# Patient Record
Sex: MALE | Race: WHITE | NOT HISPANIC OR LATINO | Employment: OTHER | URBAN - METROPOLITAN AREA
[De-identification: names, ages, dates, MRNs, and addresses within clinical notes are randomized per-mention and may not be internally consistent; named-entity substitution may affect disease eponyms.]

---

## 2017-11-28 ENCOUNTER — OFFICE VISIT (OUTPATIENT)
Dept: URGENT CARE | Facility: CLINIC | Age: 77
End: 2017-11-28
Payer: MEDICARE

## 2017-11-28 VITALS
WEIGHT: 273 LBS | BODY MASS INDEX: 40.43 KG/M2 | SYSTOLIC BLOOD PRESSURE: 141 MMHG | TEMPERATURE: 98 F | OXYGEN SATURATION: 97 % | HEART RATE: 79 BPM | HEIGHT: 69 IN | RESPIRATION RATE: 18 BRPM | DIASTOLIC BLOOD PRESSURE: 89 MMHG

## 2017-11-28 DIAGNOSIS — R42 DIZZINESS: ICD-10-CM

## 2017-11-28 DIAGNOSIS — M79.601 RIGHT ARM PAIN: ICD-10-CM

## 2017-11-28 DIAGNOSIS — R07.9 CHEST PAIN, UNSPECIFIED TYPE: Primary | ICD-10-CM

## 2017-11-28 PROCEDURE — 99203 OFFICE O/P NEW LOW 30 MIN: CPT | Mod: S$GLB,,, | Performed by: PHYSICIAN ASSISTANT

## 2017-11-28 NOTE — PROGRESS NOTES
"Subjective:       Patient ID: Boris Gupta is a 77 y.o. male.    Vitals:  height is 5' 9" (1.753 m) and weight is 123.8 kg (273 lb). His tympanic temperature is 97.8 °F (36.6 °C). His blood pressure is 141/89 (abnormal) and his pulse is 79. His respiration is 18 and oxygen saturation is 97%.     Chief Complaint: Dizziness and Elbow Pain    This is a 77 y.o. male with Past Medical History:  No date: Headache(784.0)  No date: HEARING LOSS  No date: HEART FAILURE      Comment: 15 yrs ago  No date: Hypertension  No date: Otitis media   who presents today with a chief complaint of dizziness and right elbow pain. Patient had to get help to get out of bed this morning. Better sitting up.Hx. Silent heart attack.Patient used Voltaren cream with relief.      Dizziness:   Chronicity:  New  Onset:  Today  Progression since onset:  Rapidly improving  Frequency:  Constantly  Pain Scale:  0/10  Dizziness characteristics:  Lightheaded/impending faint and spacial disorientation   Associated symptoms: light-headedness and chest pain (just started when he got into the exam room).no fever, no headaches, no nausea and no vomiting.  Elbow Pain   This is a new problem. The current episode started yesterday. The problem occurs daily. The problem has been waxing and waning. Associated symptoms include chest pain (just started when he got into the exam room). Pertinent negatives include no abdominal pain, chills, congestion, fever, headaches, joint swelling, nausea, numbness, rash, sore throat or vomiting. The symptoms are aggravated by bending.     Review of Systems   Constitution: Negative for chills and fever.   HENT: Negative for congestion and sore throat.    Eyes: Negative for blurred vision.   Cardiovascular: Positive for chest pain (just started when he got into the exam room).   Respiratory: Negative for shortness of breath.    Skin: Negative for rash.   Musculoskeletal: Positive for joint pain (right shoulder, elbow, and " thumb). Negative for back pain and joint swelling.   Gastrointestinal: Negative for abdominal pain, diarrhea, nausea and vomiting.   Neurological: Positive for dizziness and light-headedness. Negative for headaches and numbness.   Psychiatric/Behavioral: The patient is not nervous/anxious.        Objective:      Physical Exam   Constitutional: He is oriented to person, place, and time. He appears well-developed and well-nourished. No distress.   HENT:   Head: Normocephalic and atraumatic.   Eyes: Conjunctivae are normal.   Neck: Normal range of motion. Neck supple.   Cardiovascular: Normal rate.  An irregular rhythm present. Exam reveals no gallop and no friction rub.    No murmur heard.  Pulmonary/Chest: Effort normal and breath sounds normal. He has no wheezes. He has no rales.   Musculoskeletal: Normal range of motion. He exhibits tenderness (right elbow).   Neurological: He is alert and oriented to person, place, and time.   Skin: Skin is warm and dry. No rash noted. No erythema.   Psychiatric: He has a normal mood and affect. His behavior is normal. Judgment and thought content normal.   Nursing note and vitals reviewed.      9:25 AM - EKG show a fib.  I have advised the patient to go to the ED for further management of his symptoms.    9:31 AM - I spoke with the charge nurse about this patient at [facility].      Assessment:       1. Chest pain, unspecified type    2. Dizziness    3. Right arm pain        Plan:         Chest pain, unspecified type  -     IN OFFICE EKG 12-LEAD (to Muse)    Dizziness    Right arm pain      Boris Ramirez was seen today for dizziness and elbow pain.    Diagnoses and all orders for this visit:    Chest pain, unspecified type  -     IN OFFICE EKG 12-LEAD (to Muse)    Dizziness    Right arm pain      Patient Instructions   - Based on your exam today I fell you need further evaluation immediately.  You should go to the ER of your choice for further evaluation and treatment.        Uncertain  Causes of Chest Pain    Chest pain can happen for a number of reasons. Sometimes the cause can't be determined. If your condition does not seem serious, and your pain does not appear to be coming from your heart, your healthcare provider may recommend watching it closely. Sometimes the signs of a serious problem take more time to appear. Many problems not related to your heart can cause chest pain.These include:  · Musculoskeletal. Costochondritis, an inflammation of the tissues around the ribs that can occur from trauma or overuse injuries  · Respiratory. Pneumonia, pneumothorax, or pneumonitis (inflammation of the lining of the chest and lungs)  · Gastrointestinal. Esophageal reflux, heartburn, or gallbladder disease  · Anxiety and panic disorders  · Nerve compression and neuritis  · Miscellaneous problems such as aortic aneurysm or pulmonary embolism (a blood clot in the lungs)  Home care  After your visit, follow these recommendations:  · Rest today and avoid strenuous activity.  · Take any prescribed medicine as directed.  · Be aware of any recurrent chest pain and notice any changes  Follow-up care  Follow up with your healthcare provider if you do not start to feel better within 24 hours, or as advised.  Call 911  Call 911 if any of these occur:  · A change in the type of pain: if it feels different, becomes more severe, lasts longer, or begins to spread into your shoulder, arm, neck, jaw or back  · Shortness of breath or increased pain with breathing  · Weakness, dizziness, or fainting  · Rapid heart beat  · Crushing sensation in your chest  When to seek medical advice  Call your healthcare provider right away if any of the following occur:  · Cough with dark colored sputum (phlegm) or blood  · Fever of 100.4ºF (38ºC) or higher, or as directed by your healthcare provider  · Swelling, pain or redness in one leg  · Shortness of breath  Date Last Reviewed: 12/30/2015  © 3984-7147 [employer] [address]  [place] [address]. All rights reserved. This information is not intended as a substitute for professional medical care. Always follow your healthcare professional's instructions.        Dizziness (Uncertain Cause)  Dizziness is a common symptom. It may be described as lightheadedness, spinning, or feeling like you are going to faint. Dizziness can have many causes.  Be sure to tell the healthcare provider about:  · All medicines you take, including prescription, over-the-counter, herbs, and supplements  · Any other symptoms you have  · Any health problems you are being treated for  · Anything that causes the dizziness to get worse or better  Today's exam did not show an exact cause for your dizziness. Other tests may be needed. Follow up with your healthcare provider.  Home care  · Dizziness that occurs with sudden standing may be a sign of mild dehydration. Drink extra fluids for the next few days.  · If you recently started a new medicine, stopped a medicine, or had the dose of a current medicine changed, talk with the prescribing healthcare provider. Your medicine plan may need adjustment.  · If dizziness lasts more than a few seconds, sit or lie down until it passes. This may help prevent injury in case you pass out.  · Do not drive or use power tools or dangerous equipment until you have had no dizziness for at least 48 hours.  Follow-up care  Follow up with your healthcare provider for further evaluation within the next 7 days or as advised.  When to seek medical advice  Call your healthcare provider for any of the following:  · Worsening of symptoms or new symptoms  · Passing out or seizure  · Repeated vomiting  · Headache  · Palpitations (the sense that your heart is fluttering or beating fast or hard)  · Shortness of breath  · Blood in vomit or stool (black or red color)  · Weakness of an arm or leg or one side of the face  · Vision or hearing changes  · Trouble walking or  speaking  · Chest, arm, neck, back, or jaw pain  Date Last Reviewed: 8/23/2015  © 5385-5145 [employer]. [address] [place] [address], [place] [address]. All rights reserved. This information is not intended as a substitute for professional medical care. Always follow your healthcare professional's instructions.

## 2017-11-28 NOTE — PATIENT INSTRUCTIONS
- Based on your exam today I fell you need further evaluation immediately.  You should go to the ER of your choice for further evaluation and treatment.        Uncertain Causes of Chest Pain    Chest pain can happen for a number of reasons. Sometimes the cause can't be determined. If your condition does not seem serious, and your pain does not appear to be coming from your heart, your healthcare provider may recommend watching it closely. Sometimes the signs of a serious problem take more time to appear. Many problems not related to your heart can cause chest pain.These include:  · Musculoskeletal. Costochondritis, an inflammation of the tissues around the ribs that can occur from trauma or overuse injuries  · Respiratory. Pneumonia, pneumothorax, or pneumonitis (inflammation of the lining of the chest and lungs)  · Gastrointestinal. Esophageal reflux, heartburn, or gallbladder disease  · Anxiety and panic disorders  · Nerve compression and neuritis  · Miscellaneous problems such as aortic aneurysm or pulmonary embolism (a blood clot in the lungs)  Home care  After your visit, follow these recommendations:  · Rest today and avoid strenuous activity.  · Take any prescribed medicine as directed.  · Be aware of any recurrent chest pain and notice any changes  Follow-up care  Follow up with your healthcare provider if you do not start to feel better within 24 hours, or as advised.  Call 911  Call 911 if any of these occur:  · A change in the type of pain: if it feels different, becomes more severe, lasts longer, or begins to spread into your shoulder, arm, neck, jaw or back  · Shortness of breath or increased pain with breathing  · Weakness, dizziness, or fainting  · Rapid heart beat  · Crushing sensation in your chest  When to seek medical advice  Call your healthcare provider right away if any of the following occur:  · Cough with dark colored sputum (phlegm) or blood  · Fever of 100.4ºF (38ºC) or higher, or as  directed by your healthcare provider  · Swelling, pain or redness in one leg  · Shortness of breath  Date Last Reviewed: 12/30/2015  © 7437-5333 [employer]. [address] [place] [address], [place] [address]. All rights reserved. This information is not intended as a substitute for professional medical care. Always follow your healthcare professional's instructions.        Dizziness (Uncertain Cause)  Dizziness is a common symptom. It may be described as lightheadedness, spinning, or feeling like you are going to faint. Dizziness can have many causes.  Be sure to tell the healthcare provider about:  · All medicines you take, including prescription, over-the-counter, herbs, and supplements  · Any other symptoms you have  · Any health problems you are being treated for  · Anything that causes the dizziness to get worse or better  Today's exam did not show an exact cause for your dizziness. Other tests may be needed. Follow up with your healthcare provider.  Home care  · Dizziness that occurs with sudden standing may be a sign of mild dehydration. Drink extra fluids for the next few days.  · If you recently started a new medicine, stopped a medicine, or had the dose of a current medicine changed, talk with the prescribing healthcare provider. Your medicine plan may need adjustment.  · If dizziness lasts more than a few seconds, sit or lie down until it passes. This may help prevent injury in case you pass out.  · Do not drive or use power tools or dangerous equipment until you have had no dizziness for at least 48 hours.  Follow-up care  Follow up with your healthcare provider for further evaluation within the next 7 days or as advised.  When to seek medical advice  Call your healthcare provider for any of the following:  · Worsening of symptoms or new symptoms  · Passing out or seizure  · Repeated vomiting  · Headache  · Palpitations (the sense that your heart is fluttering or beating fast or hard)  · Shortness  of breath  · Blood in vomit or stool (black or red color)  · Weakness of an arm or leg or one side of the face  · Vision or hearing changes  · Trouble walking or speaking  · Chest, arm, neck, back, or jaw pain  Date Last Reviewed: 8/23/2015  © 0703-6076 [employer]. [address] [place] [address]. All rights reserved. This information is not intended as a substitute for professional medical care. Always follow your healthcare professional's instructions.

## 2020-02-17 ENCOUNTER — OFFICE VISIT (OUTPATIENT)
Dept: URGENT CARE | Facility: CLINIC | Age: 80
End: 2020-02-17
Payer: MEDICARE

## 2020-02-17 VITALS
OXYGEN SATURATION: 97 % | BODY MASS INDEX: 40.43 KG/M2 | DIASTOLIC BLOOD PRESSURE: 78 MMHG | RESPIRATION RATE: 18 BRPM | HEIGHT: 69 IN | TEMPERATURE: 98 F | WEIGHT: 273 LBS | HEART RATE: 61 BPM | SYSTOLIC BLOOD PRESSURE: 133 MMHG

## 2020-02-17 DIAGNOSIS — S80.12XA HEMATOMA OF LEFT LOWER EXTREMITY, INITIAL ENCOUNTER: Primary | ICD-10-CM

## 2020-02-17 LAB
BILIRUB UR QL STRIP: NEGATIVE
GLUCOSE UR QL STRIP: NEGATIVE
KETONES UR QL STRIP: NEGATIVE
LEUKOCYTE ESTERASE UR QL STRIP: NEGATIVE
PH, POC UA: 6 (ref 5–8)
POC BLOOD, URINE: NEGATIVE
POC NITRATES, URINE: POSITIVE
PROT UR QL STRIP: NEGATIVE
SP GR UR STRIP: 1.02 (ref 1–1.03)
UROBILINOGEN UR STRIP-ACNC: NORMAL (ref 0.3–2.2)

## 2020-02-17 PROCEDURE — 81003 URINALYSIS AUTO W/O SCOPE: CPT | Mod: QW,S$GLB,, | Performed by: FAMILY MEDICINE

## 2020-02-17 PROCEDURE — 99213 PR OFFICE/OUTPT VISIT, EST, LEVL III, 20-29 MIN: ICD-10-PCS | Mod: 25,S$GLB,, | Performed by: FAMILY MEDICINE

## 2020-02-17 PROCEDURE — 81003 POCT URINALYSIS, DIPSTICK, AUTOMATED, W/O SCOPE: ICD-10-PCS | Mod: QW,S$GLB,, | Performed by: FAMILY MEDICINE

## 2020-02-17 PROCEDURE — 99213 OFFICE O/P EST LOW 20 MIN: CPT | Mod: 25,S$GLB,, | Performed by: FAMILY MEDICINE

## 2020-02-17 RX ORDER — FUROSEMIDE 20 MG/1
TABLET ORAL
COMMUNITY
Start: 2019-12-19

## 2020-02-17 RX ORDER — GABAPENTIN 300 MG/1
CAPSULE ORAL
COMMUNITY

## 2020-02-17 NOTE — PROGRESS NOTES
"Subjective:       Patient ID: Boris Gupta is a 79 y.o. male.    Vitals:  height is 5' 9" (1.753 m) and weight is 123.8 kg (273 lb). His oral temperature is 98.3 °F (36.8 °C). His blood pressure is 133/78 and his pulse is 61. His respiration is 18 and oxygen saturation is 97%.     Chief Complaint: Groin Pain    This is a 79 y.o. male who presents today with a chief complaint of   Left leg pain to the groin. Leg is bruised and red. Noticed pain 02/16/2020. No hx of trauma     Groin Pain   The patient's pertinent negatives include no penile discharge, penile pain, scrotal swelling or testicular pain. This is a new problem. The current episode started yesterday. The problem occurs constantly. The problem has been gradually worsening. The pain is severe. Pertinent negatives include no abdominal pain, chills, dysuria, fever, frequency, nausea, rash, urgency or vomiting. There is no reported injury. The problem affects the left side. Exacerbated by: walking  He has tried nothing for the symptoms. The treatment provided no relief. He is sexually active. He never uses condoms. No, his partner does not have an STD.       Constitution: Negative for chills and fever.   Neck: Negative for painful lymph nodes.   Gastrointestinal: Negative for abdominal pain, nausea and vomiting.   Genitourinary: Negative for dysuria, frequency, urgency, urine decreased, hematuria, history of kidney stones, genital trauma, painful intercourse, genital sore, penile discharge, painful ejaculation, penile pain, penile swelling, scrotal swelling and testicular pain.        Groin pain    Musculoskeletal: Positive for pain. Negative for back pain.        Left leg pain     Skin: Negative for rash and lesion.   Hematologic/Lymphatic: Negative for swollen lymph nodes.       Objective:      Physical Exam   Constitutional: He appears well-developed and well-nourished.   Cardiovascular: Normal rate, regular rhythm and normal heart sounds.   Skin: " Lesions:  bruising and ecchymosis (left inner thigh)  Nursing note and vitals reviewed.        Assessment:       1. Hematoma of left lower extremity, initial encounter      ? hypercoagulable state on coumadin. Discussed with patient's PCP's office who will order a  PT/INR stat and follow up with patient  Plan:         Hematoma of left lower extremity, initial encounter  -     POCT Urinalysis, Dipstick, Automated, W/O Scope

## 2020-02-17 NOTE — PATIENT INSTRUCTIONS
Bruises (Contusions)    A contusion is a bruise. A bruise happens when a blow to your body doesn't break the skin but does break blood vessels beneath the skin. Blood leaking from the broken vessels causes redness and swelling. As it heals, your bruise is likely to turn colors like purple, green, and yellow. This is normal. The bruise should fade in 2 or 3 weeks.  Factors that make you more likely to bruise  Almost everyone bruises now and then. Certain people do bruise more easily than others. You're more prone to bruising as you get older. That's because blood vessels become more fragile with age. You're also more likely to bruise if you have a clotting disorder such as hemophilia or take medications that reduce clotting, including aspirin, coumadin, newer agents.  When to go to the emergency room (ER)  Bruises almost always heal on their own without special treatment. But for some people, a bad bruise can be serious. Seek medical care if you:  · Have a clotting disorder such as hemophilia.  · Have cirrhosis or other serious liver disease.  · Take blood-thinning medications such as warfarin (Coumadin).  What to expect in the ER  A doctor will examine your bruise and ask about any health conditions you have. In some cases, you may have a test to check how well your blood clots. Other treatment will depend on your needs.  Follow-up care  Sometimes a bruise gets worse instead of better. It may become larger and more swollen. This can occur when your body walls off a small pool of blood under the skin (hematoma). In very rare cases, your doctor may need to drain excess blood from the area.  Tip:  Apply an ice pack or bag of frozen peas to a bruise (keep a thin cloth between the cold source and your skin). This can help reduce redness and swelling.   Date Last Reviewed: 12/1/2016  © 1364-7621 [employer]. [address] [place] [address], [place] [address]. All rights reserved. This information is not intended as  a substitute for professional medical care. Always follow your healthcare professional's instructions.        What to Know When Taking Warfarin  Warfarin (brand name Coumadin) is medicine that controls how your blood clots. It is used to help prevent blood clots that may cause serious health problems. These problems include heart attack (myocardial infarction), stroke, a blockage in an artery or vein (thrombus), or a blood clot that travels to the lungs (pulmonary embolism).    Before you start taking this medicine, be sure your doctor knows if you have any of these conditions:  · Stomach ulcer now or in the past  · Vomited blood or had bloody stools (black or red color)  · Aneurysm, pericarditis, or pericardial effusion  · Blood disorder  · Recent surgery, stroke, mini-stroke, or spinal puncture  · Kidney or liver disease, uncontrolled high blood pressure, diabetes, vasculitis, heart failure, lupus or other collagen-vascular disease, or high cholesterol  · You are pregnant or breastfeeding  · You are younger than 18 years old  · Recent or planned dental procedure  Although warfarin reduces the risk for blood clots, it increases your risk of bleeding. Because of this, you must take the medicine exactly as you are told by your healthcare provider.   You will have blood tests often to check the level of warfarin in your blood. It is important to have just the right amount to balance preventing blood clots and causing excessive bleeding. If the level is too low, you are at risk for developing a blood clot. If it's too high, you are at risk for bleeding. Make sure you keep all scheduled appointments for blood testing. If you dont, you will be at risk for bleeding problems. You also need to protect yourself from injury that may cause bleeding such as a fall or hitting your head.  Follow these tips  · Take this medicine at the same time each day. Take it with a full glass of water, with or without food.  · Make sure you  know what to do if you miss a dose. If you are not sure what to do, call your healthcare provider or pharmacist. Do not take more warfarin than you were told to.  · Be sure to tell all of your providers including your dentist that you take warfarin. If you will be taking warfarin for quite a while, carry an ID card or get a Medic-Alert bracelet. This will alert medical staff in case you arent able to do so yourself. Also carry with you the name and number of the person to contact in case of an emergency.  · Keep your appointments for regular blood tests to measure the effects of warfarin. Your healthcare provider may need to change your dose based on the results of your blood test. Follow your doctors advice exactly about how to take this medicine.  · Do not stop taking the medicine without talking with your doctor.     Monitoring your PT/INR blood levels  You will need to have a blood test called a PT/INR regularly. PT stands for pro thrombin. INR stands for international normalized ratio. The PT/INR blood test is done to make sure you are getting the right dose of this medicine. The PT/INR test tells your healthcare provider how your blood is clotting. Prothrombin time, commonly referred to as pro time or PT, measures the time it takes for blood to clot. International normalized ratio or INR is a way to compare results of the PT tests done at different labs.  · Go for your blood (PT/INR) tests as often as directed. Note that diet and medicines can affect your PT/INR level.  · Your INR was between _____ and _____ .  · Ask your healthcare provider what your goal INR is. Your goal INR is between _____ and _____.  · Your next PT/INR blood draw is due on _________________ (date) at ________________ (time) by ____________________ (name of healthcare provider or clinic).  · The name of the healthcare provider who is monitoring your anticoagulation therapy is ______________________ and the phone number is  ___________________.  · Follow up with your healthcare provider or as advised by his or her staff. It usually takes a few hours for your doctor to get the results of your clotting tests. Call to get your lab results and find out if your doctor needs to make further changes to your warfarin dose.  · If your labs (PT/INR) are drawn at a location other than your doctor's office, remember to tell your doctor as soon as you get your lab results.      What to do at home  1. Adjust your warfarin dose as directed by your healthcare provider, ____________________ (name of healthcare provider).  2. Have your blood clotting test done every _______ days at _____________________ (name of clinic) by ____________________ (name of healthcare provider).  3. Prevent injuries and bleeding:  ¨ Do not go barefoot. Always wear shoes.  ¨ Do not trim corns or calluses yourself.  ¨ Use an electric razor to shave instead of a manual one.  ¨ Use a soft-bristled toothbrush and waxed dental floss.  4. Some medicines can affect your blood clotting. Always talk with your healthcare provider before stopping, starting, or changing any prescription or over-the-counter (OTC) medicines. This includes herbal medicines and vitamins. Talk with your healthcare provider about the following medicines:  ¨ Some antibiotics. This is critical because some common antibiotics can cause severe bleeding if you take them along with warfarin. These antibiotics include ciprofloxacin and trimethoprim-sulfamethoxazole.   ¨ Some heart medicines  ¨ Cimetidine  ¨ Aspirin or other anti-inflammatory drugs, such as ibuprofen, naproxen, ketoprofen, or other arthritis medicines  ¨ Some drugs for depression, cancer, HIV (protease inhibitors), diabetes, seizures, gout, high cholesterol, or thyroid replacement  ¨ Vitamins containing Vitamin K  ¨ Herbal products such as ginkgo, Q10, garlic, or [place]'s wort  5. Avoid major changes in your diet. Consuming high amounts of foods  containing vitamin K can reduce the effectiveness of your warfarin.      Keep your diet steady  Keep your diet pretty much the same each day. Thats because many foods contain vitamin K. Vitamin K helps your blood clot. So eating disproportionately high amounts of foods that contain vitamin K can affect the way warfarin works. You dont need to avoid foods that have vitamin K. But you do need to keep the amount of them you eat steady (about the same day to day). If you change your diet for any reason, such as for illness or to lose weight, be sure to tell your doctor.  · Examples of foods containing vitamin K are asparagus, avocado, broccoli, cabbage, kale, spinach, and some other leafy green vegetables. Oils such as soybean, canola, and olive oils also contain vitamin K.  · Other food products can affect the way warfarin works in your body:  ¨ Food products that may affect blood clotting include cranberries and cranberry juice, fish oil supplements, garlic, katerin, licorice, and turmeric.  ¨ Herbs used in herbal teas or supplements can also affect blood clotting. Keep the amount of herbal teas and supplements you use steady.  ¨ Alcohol can increase the effect of warfarin in your body.  Talk with your healthcare provider if you have concerns about these or other food products and their effects on warfarin.  When to call your healthcare provider  Warfarin increases your risk of bleeding. Call your healthcare provider right away before you take your next dose of warfarin if you have any of these problems:  · Bleeding that doesnt stop in 10 minutes. This might be from a bloody nose or a cut, for example.  · A heavier-than-normal period or bleeding between periods  · Coughing or throwing up blood or something that looks like coffee grounds  · Nausea, bloating, or diarrhea  · Bleeding hemorrhoids  · Dark red or brown urine  · Red or black tarry stools  · Red or black-and-blue marks on the skin that get larger  · A fever  or an illness that gets worse  · Dizziness, headache, weakness, or fatigue  · Chest pain or trouble breathing  · A serious fall or a blow to the head  · Swelling or pain after an injury or at an injection site  · Bleeding gums after brushing your teeth  What to watch for  If you have any of the following allergic reactions, call your doctor right away or go to the hospital.  · Rash  · Itching  · Swelling  · Trouble swallowing or breathing  [NOTE: This information topic may not include all directions, precautions, medical conditions, drug/food interactions, and warnings for this drug. Check with your doctor, nurse, or pharmacist for any questions that you may have.]  Date Last Reviewed: 6/1/2016  © 7742-3131 [employer]. [address] [place] [address], [place] [address]. All rights reserved. This information is not intended as a substitute for professional medical care. Always follow your healthcare professional's instructions.

## 2020-02-18 ENCOUNTER — NURSE TRIAGE (OUTPATIENT)
Dept: ADMINISTRATIVE | Facility: CLINIC | Age: 80
End: 2020-02-18

## 2020-02-18 NOTE — TELEPHONE ENCOUNTER
Patient called in with questions about using a cold pack in reference to swelling that is above knee and going up to the groin area. The patient was advised per protocol and was advised to call back with questions,concerns . Patient verbalized understanding.     Reason for Disposition   Entire foot is cool or blue in comparison to other side   SEVERE swelling (e.g., swelling extends above knee, entire leg is swollen, weeping fluid)    Additional Information   Negative: Sounds like a life-threatening emergency to the triager   Negative: Chest pain   Negative: Small area of swelling and followed an insect bite to the area   Negative: Followed a knee injury   Negative: Ankle or foot injury   Negative: Pregnant with leg swelling or edema   Negative: Difficulty breathing at rest   Negative: Shock suspected (e.g., cold/pale/clammy skin, too weak to stand, low BP, rapid pulse)   Negative: Sounds like a life-threatening emergency to the triager   Negative: Bruises with fever   Negative: Tiny bruises (spots or dots) of unknown cause   Negative: Bruise(s) of forehead or head   Negative: Bruise(s) of face or jaw   Negative: Followed an injury, and triager doesn't know which injury guideline to use first   Negative: Post-operative bruising   Negative: Dizziness or lightheadedness   Negative: [1] Bruise on head/face, chest, or abdomen AND [2]  taking Coumadin (warfarin) or other strong blood thinner, or known bleeding disorder (e.g., thrombocytopenia)   Negative: Unexplained bleeding from another site (e.g., gums, nose, urine) as well   Negative: Patient sounds very sick or weak to the triager    Protocols used: LEG SWELLING AND EDEMA-A-OH, BRUISES-A-AH

## 2020-05-08 ENCOUNTER — LAB VISIT (OUTPATIENT)
Dept: PRIMARY CARE CLINIC | Facility: CLINIC | Age: 80
End: 2020-05-08
Payer: MEDICARE

## 2020-05-08 DIAGNOSIS — R05.9 COUGH: Primary | ICD-10-CM

## 2020-05-08 PROCEDURE — U0002 COVID-19 LAB TEST NON-CDC: HCPCS

## 2020-05-09 LAB — SARS-COV-2 RNA RESP QL NAA+PROBE: NOT DETECTED

## 2020-07-20 ENCOUNTER — LAB VISIT (OUTPATIENT)
Dept: PRIMARY CARE CLINIC | Facility: OTHER | Age: 80
End: 2020-07-20
Attending: INTERNAL MEDICINE
Payer: MEDICARE

## 2020-07-20 DIAGNOSIS — Z03.818 ENCOUNTER FOR OBSERVATION FOR SUSPECTED EXPOSURE TO OTHER BIOLOGICAL AGENTS RULED OUT: ICD-10-CM

## 2020-07-20 PROCEDURE — U0003 INFECTIOUS AGENT DETECTION BY NUCLEIC ACID (DNA OR RNA); SEVERE ACUTE RESPIRATORY SYNDROME CORONAVIRUS 2 (SARS-COV-2) (CORONAVIRUS DISEASE [COVID-19]), AMPLIFIED PROBE TECHNIQUE, MAKING USE OF HIGH THROUGHPUT TECHNOLOGIES AS DESCRIBED BY CMS-2020-01-R: HCPCS

## 2020-07-23 ENCOUNTER — TELEPHONE (OUTPATIENT)
Dept: PRIMARY CARE CLINIC | Facility: OTHER | Age: 80
End: 2020-07-23

## 2020-07-23 LAB — SARS-COV-2 RNA RESP QL NAA+PROBE: NEGATIVE

## 2020-07-23 NOTE — TELEPHONE ENCOUNTER
Called pt. Notified of negative covid results. No symptoms. No questions or concerns at this time.

## 2022-01-11 ENCOUNTER — LAB VISIT (OUTPATIENT)
Dept: PRIMARY CARE CLINIC | Facility: CLINIC | Age: 82
End: 2022-01-11
Payer: MEDICARE

## 2022-01-11 DIAGNOSIS — Z20.822 CONTACT WITH AND (SUSPECTED) EXPOSURE TO COVID-19: ICD-10-CM

## 2022-01-11 LAB
CTP QC/QA: YES
SARS-COV-2 AG RESP QL IA.RAPID: NEGATIVE

## 2022-01-11 PROCEDURE — 87811 SARS-COV-2 COVID19 W/OPTIC: CPT

## 2024-05-22 ENCOUNTER — DOCUMENT SCAN (OUTPATIENT)
Dept: HOME HEALTH SERVICES | Facility: HOSPITAL | Age: 84
End: 2024-05-22

## 2024-06-13 NOTE — ADDENDUM NOTE
Addended by: ANNEL ROBLERO on: 6/13/2024 02:20 PM     Modules accepted: Orders, Level of Service